# Patient Record
Sex: MALE | Race: BLACK OR AFRICAN AMERICAN | NOT HISPANIC OR LATINO
[De-identification: names, ages, dates, MRNs, and addresses within clinical notes are randomized per-mention and may not be internally consistent; named-entity substitution may affect disease eponyms.]

---

## 2020-06-12 ENCOUNTER — APPOINTMENT (OUTPATIENT)
Dept: UROLOGY | Facility: CLINIC | Age: 70
End: 2020-06-12
Payer: COMMERCIAL

## 2020-06-12 VITALS
DIASTOLIC BLOOD PRESSURE: 85 MMHG | TEMPERATURE: 97.1 F | WEIGHT: 155 LBS | HEIGHT: 72 IN | HEART RATE: 65 BPM | BODY MASS INDEX: 20.99 KG/M2 | SYSTOLIC BLOOD PRESSURE: 153 MMHG

## 2020-06-12 DIAGNOSIS — R97.20 ELEVATED PROSTATE, SPECIFIC ANTIGEN [PSA]: ICD-10-CM

## 2020-06-12 DIAGNOSIS — Z86.79 PERSONAL HISTORY OF OTHER DISEASES OF THE CIRCULATORY SYSTEM: ICD-10-CM

## 2020-06-12 DIAGNOSIS — Z87.442 PERSONAL HISTORY OF URINARY CALCULI: ICD-10-CM

## 2020-06-12 DIAGNOSIS — Z78.9 OTHER SPECIFIED HEALTH STATUS: ICD-10-CM

## 2020-06-12 PROCEDURE — 99203 OFFICE O/P NEW LOW 30 MIN: CPT

## 2020-06-12 RX ORDER — PROPRANOLOL HYDROCHLORIDE 80 MG/1
80 TABLET ORAL
Refills: 0 | Status: ACTIVE | COMMUNITY

## 2020-06-12 RX ORDER — HYDROCHLOROTHIAZIDE 25 MG/1
25 TABLET ORAL
Refills: 0 | Status: ACTIVE | COMMUNITY

## 2020-06-22 PROBLEM — R97.20 ELEVATED PSA: Status: ACTIVE | Noted: 2020-06-22

## 2020-06-22 NOTE — REVIEW OF SYSTEMS
[Told you have blood in urine on a urine test] : told blood was present in a urine test [History of kidney stones] : history of kidney stones [Wake up at night to urinate  How many times?  ___] : wakes up to urinate [unfilled] times during the night [Negative] : Endocrine [Urine Infection (bladder/kidney)] : denies bladder/kidney infections [Pain during urination] : denies pain during urination [Pain at onset of urination] : denies pain during onset of urination [Pain after urination] : denies pain after urination [Blood in urine that you can see] : denies seeing blood in urine [Discharge from urine canal] : denies discharge from urine canal [Urine retention] : denies urine retention [Strong urge to urinate] : denies strong urge to urinate [Bladder pressure] : denies bladder pressure [Strain or push to urinate] : denies straining or pushing to urinate [Wait a long time to urinate] : denies waiting a long time to urinate [Slow urine stream] : denies slow urine stream [Interrupted urine stream] : denies interrupted urine stream [Bladder fullness after urinating] : denies bladder fullness after urinating [Increased pain/discomfort with bladder filling] : denies increased pain/discomfort with bladder filling [Bladder problems as child. If yes, describe..] : denies bladder problems as child [Leakage of urine with straining, coughing, laughing] : denies leakage of urine with straining, coughing, and/or laughing [Unaware of when urine is leaking] : aware of when urine is leaking

## 2020-06-22 NOTE — ASSESSMENT
[FreeTextEntry1] : We had a long discussion regarding PSA level. Different parameters including PSA velocity, age-adjusted PSA, free PSA, etc reviewed. Some studies have indicated that the absolute PSA level may be more accurate than the other parameters listed above. We discussed observation and repeat PSA, prostate biopsy and prostate or pelvic MRI. The false negative rate of MRI was discussed. Risks of biopsy discussed included but were not limited to bleeding, sepsis, bacteremia, urinary tract infection, erectile dysfunction, etc. How the procedure is performed and preparation with antibiotics and enema were discussed. Risks of observation and not proceeding with biopsy were reviewed. Patient was made aware that prostate cancer can exist at any PSA level. Potential complications of delayed prostate cancer diagnosis were discussed.\par pt defers further eval at this time

## 2020-06-22 NOTE — HISTORY OF PRESENT ILLNESS
[FreeTextEntry1] : Elevated PSA \par Patient is here with an elevated PSA. He has no personal history and no family history of prostate cancer.He has no prior genitourinary history of hematuria, hematospermia, prostatitis, UTI, erectile dysfunction, urolithiasis, epididymal orchitis. \par No dysuria or hematuria\par \par psa 5.95 prior 1.8-2016